# Patient Record
Sex: MALE | Race: BLACK OR AFRICAN AMERICAN | NOT HISPANIC OR LATINO | Employment: OTHER | RURAL
[De-identification: names, ages, dates, MRNs, and addresses within clinical notes are randomized per-mention and may not be internally consistent; named-entity substitution may affect disease eponyms.]

---

## 2020-08-28 ENCOUNTER — OFFICE VISIT (OUTPATIENT)
Dept: OTOLARYNGOLOGY | Facility: CLINIC | Age: 70
End: 2020-08-28

## 2020-08-28 VITALS — TEMPERATURE: 98.3 F | HEIGHT: 72 IN | BODY MASS INDEX: 31.15 KG/M2 | WEIGHT: 230 LBS

## 2020-08-28 DIAGNOSIS — R51.9 POSTAURICULAR PAIN: ICD-10-CM

## 2020-08-28 DIAGNOSIS — H93.12 TINNITUS OF LEFT EAR: Primary | ICD-10-CM

## 2020-08-28 PROCEDURE — 99203 OFFICE O/P NEW LOW 30 MIN: CPT | Performed by: OTOLARYNGOLOGY

## 2020-08-28 RX ORDER — HYDRALAZINE HYDROCHLORIDE 50 MG/1
1 TABLET, FILM COATED ORAL 2 TIMES DAILY
COMMUNITY
Start: 2020-07-15

## 2020-08-28 RX ORDER — AMLODIPINE BESYLATE 10 MG/1
1 TABLET ORAL DAILY
COMMUNITY
Start: 2020-08-10

## 2020-08-28 RX ORDER — HYDROCHLOROTHIAZIDE 25 MG/1
1 TABLET ORAL DAILY
COMMUNITY
Start: 2020-06-13

## 2020-08-28 RX ORDER — ASPIRIN 81 MG/1
81 TABLET ORAL DAILY
COMMUNITY

## 2020-08-28 RX ORDER — VALSARTAN 320 MG/1
320 TABLET ORAL DAILY
COMMUNITY
Start: 2020-06-17

## 2020-08-28 RX ORDER — ANTIOX #8/OM3/DHA/EPA/LUT/ZEAX 250-2.5 MG
1 CAPSULE ORAL 2 TIMES DAILY
COMMUNITY

## 2020-08-28 RX ORDER — CETIRIZINE HYDROCHLORIDE 10 MG/1
10 TABLET ORAL DAILY PRN
COMMUNITY

## 2020-08-28 RX ORDER — ROSUVASTATIN CALCIUM 10 MG/1
1 TABLET, FILM COATED ORAL DAILY
COMMUNITY
Start: 2020-08-10

## 2020-08-28 RX ORDER — TADALAFIL 20 MG/1
1 TABLET ORAL AS NEEDED
COMMUNITY
Start: 2020-08-06

## 2020-08-31 NOTE — PROGRESS NOTES
Subjective   Ashok Howell is a 69 y.o. male.     History of Present Illness   Patient is seen at the request of the [facility].  He has a history of tinnitus for which he is service connected and he states is been present for years.  He says he is having new symptoms of pain in the postauricular region on the left that will radiate up to the top of his head, along with an intermittent sound in his left ear that is different from his usual tinnitus.  He describes what sounds like a pulsatile or flow type noise.  He says it is not there all the time and is not there today.  [facility] and this is available and personally reviewed and shows hearing within normal limits in both ears in all frequencies.      The following portions of the patient's history were reviewed and updated as appropriate: allergies, current medications, past family history, past medical history, past social history, past surgical history and problem list.      Ashok Howell reports that he has never smoked. He has never used smokeless tobacco. He reports that he drinks alcohol. He reports that he does not use drugs.  Patient is not a tobacco user and has not been counseled for use of tobacco products    Family History   Problem Relation Age of Onset   • Cancer Other    • Diabetes Other    • Heart disease Other        Allergies   Allergen Reactions   • Other Other (See Comments)     Calcium Channel Blockers - Bilateral Lower Extremity Swelling         Current Outpatient Medications:   •  amLODIPine (NORVASC) 10 MG tablet, Take 1 tablet by mouth Daily., Disp: , Rfl:   •  aspirin 81 MG EC tablet, Take 81 mg by mouth Daily., Disp: , Rfl:   •  cetirizine (zyrTEC) 10 MG tablet, Take 10 mg by mouth Daily As Needed for Allergies., Disp: , Rfl:   •  CRESTOR 10 MG tablet, Take 1 tablet by mouth Daily., Disp: , Rfl:   •  hydrALAZINE (APRESOLINE) 50 MG tablet, Take 1 tablet by mouth 2 (two) times a  day., Disp: , Rfl:   •  hydroCHLOROthiazide (HYDRODIURIL) 25 MG tablet, Take 1 tablet by mouth Daily., Disp: , Rfl:   •  metFORMIN (GLUCOPHAGE) 500 MG tablet, Take 1 tablet by mouth 2 (two) times a day., Disp: , Rfl:   •  Misc Natural Products (TURMERIC CURCUMIN) capsule, Take 1 capsule by mouth Daily., Disp: , Rfl:   •  Multiple Vitamin (MULTIVITAMIN PO), Take 1 tablet by mouth Daily. Mens Multivitamin 40 Plus, Disp: , Rfl:   •  multivitamins-minerals (PRESERVISION AREDS 2) capsule capsule, Take 1 capsule by mouth 2 (Two) Times a Day., Disp: , Rfl:   •  tadalafil (CIALIS) 20 MG tablet, Take 1 tablet by mouth As Needed., Disp: , Rfl:   •  valsartan (DIOVAN) 320 MG tablet, Take 320 mg by mouth Daily., Disp: , Rfl:     Past Medical History:   Diagnosis Date   • Diabetes (CMS/HCC)    • Disease of thyroid gland    • Hypertension        Past Surgical History:   Procedure Laterality Date   • INGUINAL HERNIA REPAIR     • KNEE SURGERY Right    • THYROIDECTOMY, PARTIAL Right          Review of Systems   HENT: Positive for ear pain.    Eyes: Positive for visual disturbance.   Musculoskeletal: Positive for arthralgias, back pain and neck pain.   Neurological: Positive for headaches.   Psychiatric/Behavioral:        Not assessed   All other systems reviewed and are negative.          Objective   Physical Exam    General: Well-developed well-nourished male in no acute distress.  Alert and oriented x3. Head: Normocephalic. Face: Symmetrical strength and appearance. PERRL. EOMI. Voice:Strong. Speech:Fluent  Ears: External ears no deformity, canals no discharge, tympanic membranes intact clear and mobile bilaterally.  Nose: Nares show no discharge mass polyp or purulence.  Boggy mucosa is present.  No gross external deformity.  Septum: To the left  Oral cavity: Lips and gums without lesions.  Tongue and floor of mouth without lesions.  Parotid and submandibular ducts unobstructed.  No mucosal lesions on the buccal mucosa or  vestibule of the mouth.  Pharynx: No erythema exudate mass or ulcer  Neck: No lymphadenopathy.  No thyromegaly.  Trachea and larynx midline.  No masses in the parotid or submandibular glands.      Assessment/Plan   Ashok was seen today for tinnitus left ear.    Diagnoses and all orders for this visit:    Tinnitus of left ear    Postauricular pain      Plan: Explained to the patient that his postauricular pain is almost certainly a manifestation of cervical neuralgia and is likely either related to arthritis of his cervical spine or possibly a a problem with the disks in his neck.  He should discuss this further with his primary care provider.  I explained that the postauricular pain was unrelated to his ear symptoms.  I explained that the new sounds that he is hearing in his ear sound like audible blood flow consistent with either a venous hum or transient eustachian tube dysfunction.  With him not having symptoms today and with him having normal hearing bilaterally I do not think any additional work-up such as vascular studies would be necessary at this point.  I told him to call if the symptoms became persistent and I would be glad to reevaluate him.